# Patient Record
Sex: FEMALE | Race: WHITE | Employment: UNEMPLOYED | URBAN - METROPOLITAN AREA
[De-identification: names, ages, dates, MRNs, and addresses within clinical notes are randomized per-mention and may not be internally consistent; named-entity substitution may affect disease eponyms.]

---

## 2021-10-20 ENCOUNTER — OFFICE VISIT (OUTPATIENT)
Dept: UROLOGY | Facility: HOSPITAL | Age: 71
End: 2021-10-20
Attending: OBSTETRICS & GYNECOLOGY
Payer: MEDICARE

## 2021-10-20 VITALS — WEIGHT: 254 LBS | BODY MASS INDEX: 42.32 KG/M2 | TEMPERATURE: 97 F | HEIGHT: 65 IN

## 2021-10-20 DIAGNOSIS — R39.15 URINARY URGENCY: Primary | ICD-10-CM

## 2021-10-20 DIAGNOSIS — Z87.440 HISTORY OF RECURRENT UTIS: ICD-10-CM

## 2021-10-20 DIAGNOSIS — N81.89 PELVIC FLOOR WEAKNESS: ICD-10-CM

## 2021-10-20 DIAGNOSIS — N39.41 URGE INCONTINENCE: ICD-10-CM

## 2021-10-20 PROCEDURE — 87186 SC STD MICRODIL/AGAR DIL: CPT | Performed by: OBSTETRICS & GYNECOLOGY

## 2021-10-20 PROCEDURE — 81002 URINALYSIS NONAUTO W/O SCOPE: CPT | Performed by: OBSTETRICS & GYNECOLOGY

## 2021-10-20 PROCEDURE — 87086 URINE CULTURE/COLONY COUNT: CPT | Performed by: OBSTETRICS & GYNECOLOGY

## 2021-10-20 PROCEDURE — 99202 OFFICE O/P NEW SF 15 MIN: CPT

## 2021-10-20 PROCEDURE — 87077 CULTURE AEROBIC IDENTIFY: CPT | Performed by: OBSTETRICS & GYNECOLOGY

## 2021-10-20 PROCEDURE — 51701 INSERT BLADDER CATHETER: CPT | Performed by: OBSTETRICS & GYNECOLOGY

## 2021-10-20 RX ORDER — METOPROLOL TARTRATE 50 MG/1
50 TABLET, FILM COATED ORAL 3 TIMES DAILY
COMMUNITY
Start: 2021-04-28 | End: 2021-11-17

## 2021-10-20 RX ORDER — RIVAROXABAN 20 MG/1
20 TABLET, FILM COATED ORAL
COMMUNITY
Start: 2021-10-05

## 2021-10-20 RX ORDER — ROSUVASTATIN CALCIUM 20 MG/1
20 TABLET, COATED ORAL EVERY EVENING
COMMUNITY
Start: 2021-10-04

## 2021-10-20 RX ORDER — ESTRADIOL 0.1 MG/G
CREAM VAGINAL
COMMUNITY
Start: 2021-07-11 | End: 2021-11-10

## 2021-10-20 RX ORDER — MIRABEGRON 50 MG/1
1 TABLET, FILM COATED, EXTENDED RELEASE ORAL DAILY
COMMUNITY
Start: 2021-10-08 | End: 2021-11-17

## 2021-10-20 NOTE — PROCEDURES
Pt voided 75 ml cloudy, concentrated urine in privacy of bathroom. Cathed per Dr. Cristiane Bose for 30 ml PVR, see dipstick. Sent culture.

## 2021-10-20 NOTE — PROGRESS NOTES
ID: Patricia Chen  : 1950  Date: 10/20/2021       Patient presents with:  Urinary Urgency: self referred      HPI:  The patient is a 70year-old female,  (vaginal deliveries), who is self referred.   She presents for evaluation of urinary urg OBESITY    • MONSE (obstructive sleep apnea)    • Peripheral neuropathy    • Restless leg syndrome    • Sleep apnea       Past Surgical History:   Procedure Laterality Date   • CARDIOVERSION  2021    also 2008   • CATARACT     • COLONOSCOPY  2/05    Dr. Roberto Louis Disp: , Rfl:   rosuvastatin 20 MG Oral Tab, Take 20 mg by mouth every evening., Disp: , Rfl:   Sertraline HCl 50 MG Oral Tab, Take 50 mg by mouth daily. , Disp: , Rfl:   NEUPRO 6 MG/24HR Transdermal Patch 24 Hr, , Disp: , Rfl:   gabapentin (NEURONTIN) 600 M hemorrhoids  Rectum: deferred     PELVIS FLOOR NEUROMUSCULAR FUNCTION:  Strength:  2  Perineal Sensation:  Normal      PELVIC SUPPORT:  [place]:  0  Ant:  0  Post:  2  CST:  negative  UVJ: not hypermobile    PROLAPSE ASSESSMENT SCALE:      Aa: -3 Ba: -3 C: -7 up after testing. Patient and  agree with plan.      Markus Belcher MD, [facility]  Female Pelvic Medicine and  Reconstructive Surgery (Urogynecology)  [phone] (Pager)

## 2021-10-22 ENCOUNTER — TELEPHONE (OUTPATIENT)
Dept: UROLOGY | Facility: HOSPITAL | Age: 71
End: 2021-10-22

## 2021-10-22 RX ORDER — NITROFURANTOIN 25; 75 MG/1; MG/1
100 CAPSULE ORAL 2 TIMES DAILY
Qty: 14 CAPSULE | Refills: 0 | Status: SHIPPED | OUTPATIENT
Start: 2021-10-22 | End: 2021-10-29

## 2021-10-22 NOTE — TELEPHONE ENCOUNTER
reviewed culture results with Dr. Jo Ann Milian (covering for Dr. Moe Red), per written note from Dr. Chrissie Harris 100 mg tabs one tab po BID for 7 days, medication e-scribed, pt notified, verbalizing understanding of orders

## 2021-11-10 ENCOUNTER — OFFICE VISIT (OUTPATIENT)
Dept: UROLOGY | Facility: HOSPITAL | Age: 71
End: 2021-11-10
Attending: OBSTETRICS & GYNECOLOGY
Payer: MEDICARE

## 2021-11-10 VITALS — TEMPERATURE: 97 F | RESPIRATION RATE: 16 BRPM | BODY MASS INDEX: 42 KG/M2 | WEIGHT: 254 LBS

## 2021-11-10 DIAGNOSIS — N95.2 POSTMENOPAUSAL ATROPHIC VAGINITIS: ICD-10-CM

## 2021-11-10 DIAGNOSIS — R39.15 URINARY URGENCY: Primary | ICD-10-CM

## 2021-11-10 PROCEDURE — 51741 ELECTRO-UROFLOWMETRY FIRST: CPT

## 2021-11-10 PROCEDURE — 51729 CYSTOMETROGRAM W/VP&UP: CPT

## 2021-11-10 PROCEDURE — 87086 URINE CULTURE/COLONY COUNT: CPT

## 2021-11-10 PROCEDURE — 51797 INTRAABDOMINAL PRESSURE TEST: CPT

## 2021-11-10 PROCEDURE — 81002 URINALYSIS NONAUTO W/O SCOPE: CPT

## 2021-11-10 PROCEDURE — 51784 ANAL/URINARY MUSCLE STUDY: CPT

## 2021-11-10 RX ORDER — ESTRADIOL 0.1 MG/G
0.5 CREAM VAGINAL
Qty: 42.5 G | Refills: 2 | Status: SHIPPED | OUTPATIENT
Start: 2021-11-10

## 2021-11-10 NOTE — PROGRESS NOTES
Patient currently taking Estrace vaginal cream as documented by Dr. Sherif Mujica on visit 1/20/21 - requested for refill to be processed to pharmacy - completed   Recently completed MAcrobid for UTI - denies UTI symptoms today - cath specimen small for leukocyt

## 2021-11-10 NOTE — PROCEDURES
Patient here for urodynamic testing. Procedure explained and confirmed by patient. See evaluation form for results. Both verbal and written discharge instructions were given.   Patient tolerated procedure well and will follow up with Dr. Reyna King Mobile  [x]  Fixed    Perineal Sensation:  [x]  Normal  []  Abnormal    Additional Notes: Challenging urethral visualization due to patient weight     PROLAPSE (past introitus):  []  Yes  [x]  No  Prolapse reduced for testing?   [x]  Yes  []  No  [x]  Mardenaissatou Colon volume:      244 plus  mL  Maximum flow rate:       7 mL/sec  Pressure Detrusor (at maximum flow):   32         cm H2O  Post void residual:          35     mL  Voiding mechanism:  []  Abnormal  [x]  Normal  []  Strain to void   []  Weak detrusor  Void:   [

## 2021-11-10 NOTE — PATIENT INSTRUCTIONS
[address]  [place] [address]  [facility]: [phone]  FAX: [phone]       Urodynamic Testing Discharge Instructions: There are NO dietary or activity restrictions.   You may resume

## 2021-11-17 ENCOUNTER — OFFICE VISIT (OUTPATIENT)
Dept: UROLOGY | Facility: HOSPITAL | Age: 71
End: 2021-11-17
Attending: OBSTETRICS & GYNECOLOGY
Payer: MEDICARE

## 2021-11-17 VITALS — BODY MASS INDEX: 42.32 KG/M2 | HEIGHT: 65 IN | TEMPERATURE: 97 F | WEIGHT: 254 LBS

## 2021-11-17 DIAGNOSIS — N81.89 PELVIC FLOOR WEAKNESS: ICD-10-CM

## 2021-11-17 DIAGNOSIS — N39.41 URGE INCONTINENCE: Primary | ICD-10-CM

## 2021-11-17 DIAGNOSIS — Z87.440 HISTORY OF RECURRENT UTIS: ICD-10-CM

## 2021-11-17 PROBLEM — N32.81 DETRUSOR INSTABILITY: Status: ACTIVE | Noted: 2021-11-17

## 2021-11-17 PROCEDURE — 99212 OFFICE O/P EST SF 10 MIN: CPT

## 2021-11-17 NOTE — PROGRESS NOTES
ID: Jace Alas  : 1950  Date: 21      Patient presents with:  UDS Follow-up      HPI:  The patient is a 70year-old female,  (vaginal deliveries), who was last seen in the office on 10/20/21.  Please refer to previous office note for undergone without complication on 57/89/91. Results reviewed with the patient and . 122 ml void (12 ml PVR)  264 ml capacity  + Unstable detrusor at 254 mL, resulting in urgency and leaking.   No urodynamic ANDREW with cough or Valsalva at any volumes

## 2022-03-09 ENCOUNTER — OFFICE VISIT (OUTPATIENT)
Dept: UROLOGY | Facility: CLINIC | Age: 72
End: 2022-03-09
Attending: OBSTETRICS & GYNECOLOGY
Payer: MEDICARE

## 2022-03-09 VITALS
BODY MASS INDEX: 42 KG/M2 | DIASTOLIC BLOOD PRESSURE: 70 MMHG | TEMPERATURE: 97 F | WEIGHT: 254 LBS | RESPIRATION RATE: 16 BRPM | SYSTOLIC BLOOD PRESSURE: 120 MMHG

## 2022-03-09 DIAGNOSIS — N81.89 PELVIC FLOOR WEAKNESS: ICD-10-CM

## 2022-03-09 DIAGNOSIS — N39.41 URGE INCONTINENCE: Primary | ICD-10-CM

## 2022-03-09 PROCEDURE — 99212 OFFICE O/P EST SF 10 MIN: CPT

## 2022-03-09 RX ORDER — SOLIFENACIN SUCCINATE 5 MG/1
5 TABLET, FILM COATED ORAL DAILY
Qty: 30 TABLET | Refills: 3 | Status: SHIPPED | OUTPATIENT
Start: 2022-03-09 | End: 2022-03-24 | Stop reason: ALTCHOICE

## 2022-03-16 ENCOUNTER — TELEPHONE (OUTPATIENT)
Dept: UROLOGY | Facility: CLINIC | Age: 72
End: 2022-03-16

## 2022-03-16 NOTE — TELEPHONE ENCOUNTER
Called patient, insurance denied Dr. Eric Belcher Elvie out this week, will wait until her return to decide on an alternative, pt verbalizing understanding

## 2022-03-24 ENCOUNTER — TELEPHONE (OUTPATIENT)
Dept: UROLOGY | Facility: CLINIC | Age: 72
End: 2022-03-24

## 2022-03-24 RX ORDER — FESOTERODINE FUMARATE 4 MG/1
4 TABLET, FILM COATED, EXTENDED RELEASE ORAL DAILY
Qty: 30 TABLET | Refills: 2 | Status: SHIPPED | OUTPATIENT
Start: 2022-03-24 | End: 2022-04-23

## 2022-03-24 NOTE — TELEPHONE ENCOUNTER
Elisa denied by [facility], Dr. Radha Donohue informed, VORB Toviaz 4 mg tabs one tab po daily #30 with 2 refills, to call in 2-3 weeks with an update, patient notified verbalizing understanding, will e-scribe

## 2022-04-05 NOTE — TELEPHONE ENCOUNTER
Pt phoned after receiving a refill request for macrobid from for Dr Ez Nj. Pt sees Dr Angel Goldstein. LM for pt on VM regarding if this is an error. If pt is having UTI sx, will need a ucx. Request a callback.

## 2022-04-07 RX ORDER — NITROFURANTOIN 25; 75 MG/1; MG/1
CAPSULE ORAL
Qty: 14 CAPSULE | Refills: 0 | OUTPATIENT
Start: 2022-04-07

## 2022-04-12 ENCOUNTER — TELEPHONE (OUTPATIENT)
Dept: UROLOGY | Facility: CLINIC | Age: 72
End: 2022-04-12

## 2022-04-12 NOTE — TELEPHONE ENCOUNTER
Pt called with condition update, LM that she is happy with Juhi Vandana, stating \"its wonderful. Gara Jazmin) has stopped the urge and pressure\". Pt has scheduled follow up with Dr. Randy Romo 6-15-22.

## 2022-06-13 DIAGNOSIS — N39.41 URGE INCONTINENCE: Primary | ICD-10-CM

## 2022-06-15 ENCOUNTER — OFFICE VISIT (OUTPATIENT)
Dept: UROLOGY | Facility: CLINIC | Age: 72
End: 2022-06-15
Attending: OBSTETRICS & GYNECOLOGY
Payer: MEDICARE

## 2022-06-15 VITALS — HEIGHT: 65 IN | TEMPERATURE: 97 F | WEIGHT: 254 LBS | BODY MASS INDEX: 42.32 KG/M2

## 2022-06-15 DIAGNOSIS — N39.41 URGE INCONTINENCE: Primary | ICD-10-CM

## 2022-06-15 DIAGNOSIS — Z87.440 HISTORY OF RECURRENT UTIS: ICD-10-CM

## 2022-06-15 PROCEDURE — 99212 OFFICE O/P EST SF 10 MIN: CPT

## 2022-06-15 RX ORDER — SOLIFENACIN SUCCINATE 5 MG/1
5 TABLET, FILM COATED ORAL DAILY
COMMUNITY
End: 2022-06-15

## 2022-06-15 RX ORDER — FESOTERODINE FUMARATE 4 MG/1
TABLET, FILM COATED, EXTENDED RELEASE ORAL
Qty: 90 TABLET | Refills: 3 | Status: SHIPPED | OUTPATIENT
Start: 2022-06-15 | End: 2022-06-15

## 2022-06-15 RX ORDER — FESOTERODINE FUMARATE 4 MG/1
TABLET, FILM COATED, EXTENDED RELEASE ORAL
Qty: 90 TABLET | Refills: 3 | Status: SHIPPED | OUTPATIENT
Start: 2022-06-15

## 2022-06-15 NOTE — TELEPHONE ENCOUNTER
Patient in office today(6/15/2022) happy with medication tolerating dry month. Tania Hodge 4 mg q day may have a year prescription.

## 2022-07-01 ENCOUNTER — TELEPHONE (OUTPATIENT)
Dept: UROLOGY | Facility: CLINIC | Age: 72
End: 2022-07-01

## 2022-07-01 DIAGNOSIS — R30.0 DYSURIA: Primary | ICD-10-CM

## 2022-07-01 RX ORDER — NITROFURANTOIN 25; 75 MG/1; MG/1
100 CAPSULE ORAL 2 TIMES DAILY
Qty: 14 CAPSULE | Refills: 0 | Status: SHIPPED | OUTPATIENT
Start: 2022-07-01

## 2022-07-01 NOTE — TELEPHONE ENCOUNTER
Patient having dysuria. Lives in [place]. Plan to get urine culture at [facility] order faxed to [phone].   TORMADISYN Marie Macrobid 100 mg BID for 7 days dispense #14 tabs

## 2022-07-03 NOTE — TELEPHONE ENCOUNTER
Order faxed successfully to [facility] lab 7/1/2022. Patient left message stating [facility] did not get order. Patient went 7/2 unable to have culture done. Started macrobid, will call with an update 7/5 as it is a holiday weekend. Apologetic and grateful to have the medication.

## 2022-07-29 DIAGNOSIS — N95.2 POSTMENOPAUSAL ATROPHIC VAGINITIS: ICD-10-CM

## 2022-07-29 RX ORDER — ESTRADIOL 0.1 MG/G
1 CREAM VAGINAL
Qty: 42.5 G | Refills: 3 | Status: SHIPPED | OUTPATIENT
Start: 2022-07-29

## 2022-11-18 ENCOUNTER — TELEPHONE (OUTPATIENT)
Dept: UROLOGY | Facility: CLINIC | Age: 72
End: 2022-11-18

## 2022-11-18 NOTE — TELEPHONE ENCOUNTER
Tc from pt requesting for alternate for estrace cream as her insurance is now charging $400/ tube for refill with new medicare enrollment   Enrollment ends 12/7, pt needing to remedy cost before than.   Tc to patient to discuss good rx but both phone lines No answer, no vm ability to leave message

## 2022-12-14 ENCOUNTER — OFFICE VISIT (OUTPATIENT)
Dept: UROLOGY | Facility: CLINIC | Age: 72
End: 2022-12-14
Attending: OBSTETRICS & GYNECOLOGY
Payer: MEDICARE

## 2022-12-14 VITALS — BODY MASS INDEX: 38.32 KG/M2 | WEIGHT: 230 LBS | HEIGHT: 65 IN | TEMPERATURE: 98 F

## 2022-12-14 DIAGNOSIS — Z87.440 HISTORY OF RECURRENT UTIS: ICD-10-CM

## 2022-12-14 DIAGNOSIS — N95.2 POSTMENOPAUSAL ATROPHIC VAGINITIS: ICD-10-CM

## 2022-12-14 DIAGNOSIS — N39.41 URGE INCONTINENCE: Primary | ICD-10-CM

## 2022-12-14 DIAGNOSIS — N81.89 PELVIC FLOOR WEAKNESS: ICD-10-CM

## 2022-12-14 PROCEDURE — 99212 OFFICE O/P EST SF 10 MIN: CPT

## 2022-12-14 RX ORDER — TOPIRAMATE 25 MG/1
25 TABLET ORAL 2 TIMES DAILY
COMMUNITY
Start: 2022-10-04

## 2023-03-28 ENCOUNTER — TELEPHONE (OUTPATIENT)
Dept: UROLOGY | Facility: CLINIC | Age: 73
End: 2023-03-28

## 2023-03-28 NOTE — TELEPHONE ENCOUNTER
[facility]  [id number] Pt notified of progress, awaiting next steps via fax

## 2023-03-30 NOTE — TELEPHONE ENCOUNTER
Attempted to reach patient to inform Lily requesting pt try Gemtesa before Toviaz full fill. No VM or ability to leave message.   Will try again 3/31

## 2023-03-31 NOTE — TELEPHONE ENCOUNTER
Second attempt to call pt regarding [employer] requesting pt try René Wray for full coverage prior to full coverage of Trospium. Both home and cell phone called, no vm availability.

## 2023-04-12 ENCOUNTER — TELEPHONE (OUTPATIENT)
Dept: UROLOGY | Facility: CLINIC | Age: 73
End: 2023-04-12

## 2023-04-14 ENCOUNTER — TELEPHONE (OUTPATIENT)
Dept: UROLOGY | Facility: CLINIC | Age: 73
End: 2023-04-14

## 2023-04-14 RX ORDER — VIBEGRON 75 MG/1
1 TABLET, FILM COATED ORAL DAILY
Qty: 30 TABLET | Refills: 1 | Status: SHIPPED | OUTPATIENT
Start: 2023-04-14 | End: 2023-05-14

## 2023-04-14 NOTE — TELEPHONE ENCOUNTER
Patient called office, states that insurance will not cover her current medication of Toviaz without trying Myrbetriq and Gemtesa. Patient has already tried Myrbetriq with no improvement. Fax was received from [place] on 3/14/23. Dr. Lucille Alvarez reviewed the fax and wrote that we may order Ophelia [place] for the patient. Fax was initialed and dated by Dr. Lucille Alvarez on 3/24/23. Order entered for Ophelia [place] and sent to pharmacy on file. Attempted to call patient back on her landline at [phone] to inform her of the medication change and it being sent to her pharmacy on file. No answer, no voice message available.

## 2023-04-14 NOTE — TELEPHONE ENCOUNTER
Attempted to call patient again to inform her of Sharnatalya Isaiah being ordered at pharmacy on file. No answer and no voice mail to leave message.

## 2023-04-20 ENCOUNTER — TELEPHONE (OUTPATIENT)
Dept: UROLOGY | Facility: CLINIC | Age: 73
End: 2023-04-20

## 2023-04-20 NOTE — TELEPHONE ENCOUNTER
Patient LVM on RN line stating that she would like someone to call her regarding Toviaz Rx. Attempted to call patient back, but there was no answer and no voice mail to leave message.

## 2023-05-03 ENCOUNTER — TELEPHONE (OUTPATIENT)
Dept: UROLOGY | Facility: CLINIC | Age: 73
End: 2023-05-03

## 2023-05-03 NOTE — TELEPHONE ENCOUNTER
Renewal of a Prior Authorization done today on Cover My Meds; Ruiz [id number]. After resubmission of the PA, a message was received that \"Drug is covered by current benefit plan, No further PA activity needed. \"    Attempted to contact patient on home phone number, no answer, no voice mail. Spoke with patient on mobile number and informed her of above. Informed her to contact the pharmacy and /or insurance to verify that the medication is covered. Requested a call back if there is a problem with the RX. Patient verbalized understanding.

## 2023-06-07 ENCOUNTER — OFFICE VISIT (OUTPATIENT)
Dept: UROLOGY | Facility: CLINIC | Age: 73
End: 2023-06-07
Attending: OBSTETRICS & GYNECOLOGY
Payer: MEDICARE

## 2023-06-07 VITALS — BODY MASS INDEX: 38.32 KG/M2 | TEMPERATURE: 99 F | WEIGHT: 230 LBS | HEIGHT: 65 IN

## 2023-06-07 DIAGNOSIS — N81.89 PELVIC FLOOR WEAKNESS: ICD-10-CM

## 2023-06-07 DIAGNOSIS — N95.2 POSTMENOPAUSAL ATROPHIC VAGINITIS: ICD-10-CM

## 2023-06-07 DIAGNOSIS — Z87.440 HISTORY OF RECURRENT UTIS: ICD-10-CM

## 2023-06-07 DIAGNOSIS — N39.41 URGE INCONTINENCE: Primary | ICD-10-CM

## 2023-06-07 PROCEDURE — 99212 OFFICE O/P EST SF 10 MIN: CPT

## 2023-06-07 RX ORDER — FESOTERODINE FUMARATE 4 MG/1
TABLET, EXTENDED RELEASE ORAL
COMMUNITY
Start: 2023-03-09 | End: 2023-06-07 | Stop reason: ALTCHOICE

## 2023-06-07 RX ORDER — ESTRADIOL 0.1 MG/G
CREAM VAGINAL
Qty: 42.5 G | Refills: 3 | Status: SHIPPED | OUTPATIENT
Start: 2023-06-07

## 2023-06-07 RX ORDER — FESOTERODINE FUMARATE 4 MG/1
4 TABLET, EXTENDED RELEASE ORAL DAILY
Qty: 90 TABLET | Refills: 3 | Status: SHIPPED | OUTPATIENT
Start: 2023-06-07

## 2024-03-12 ENCOUNTER — TELEPHONE (OUTPATIENT)
Dept: UROLOGY | Facility: CLINIC | Age: 74
End: 2024-03-12

## 2024-03-12 NOTE — TELEPHONE ENCOUNTER
TC from pt saying her insurance is telling her toviaz is not covered any more. Advised pt to check which meds are on her formulary list. Pt verbalized an understanding and agrees w/ plan. All questions answered.

## 2024-03-15 RX ORDER — TROSPIUM CHLORIDE ER 60 MG/1
60 CAPSULE ORAL DAILY
Qty: 30 CAPSULE | Refills: 1 | Status: SHIPPED | OUTPATIENT
Start: 2024-03-15 | End: 2024-03-18

## 2024-03-15 NOTE — TELEPHONE ENCOUNTER
TC from pt saying myrbetriq is covered, but she feels myrbetriq makes her \"feels funny, like I'm flying\". Reviewed Dr Maldonado's notes that said pt reported not a lot of improvement w/ Myrbetriq. Will message Dr Maldonado.

## 2024-03-15 NOTE — TELEPHONE ENCOUNTER
TC back to pt after speaking to Dr Maldonado. TORB Trospium 60mg po dly. Take on empty stomach.  Pt has a month supply left of toviaz and will finish that before starting trospium.   Per Dr Maldonado, may f/u w/ PA. Appt made w/ JOSE CARLOS Buchanan in 6 wks.  Pt verbalized an understanding and agrees w/ plan. All questions answered.

## 2024-03-18 RX ORDER — TROSPIUM CHLORIDE ER 60 MG/1
60 CAPSULE ORAL DAILY
Qty: 90 CAPSULE | Refills: 0 | Status: SHIPPED | OUTPATIENT
Start: 2024-03-18

## 2024-04-22 ENCOUNTER — OFFICE VISIT (OUTPATIENT)
Dept: UROLOGY | Facility: CLINIC | Age: 74
End: 2024-04-22
Attending: PHYSICIAN ASSISTANT
Payer: MEDICARE

## 2024-04-22 VITALS — HEART RATE: 60 BPM | BODY MASS INDEX: 38 KG/M2 | RESPIRATION RATE: 18 BRPM | HEIGHT: 65 IN

## 2024-04-22 DIAGNOSIS — N81.89 PELVIC FLOOR WEAKNESS: ICD-10-CM

## 2024-04-22 DIAGNOSIS — N39.41 URGE INCONTINENCE: Primary | ICD-10-CM

## 2024-04-22 DIAGNOSIS — N95.2 POSTMENOPAUSAL ATROPHIC VAGINITIS: ICD-10-CM

## 2024-04-22 PROCEDURE — 99212 OFFICE O/P EST SF 10 MIN: CPT

## 2024-04-22 RX ORDER — TROSPIUM CHLORIDE ER 60 MG/1
60 CAPSULE ORAL DAILY
Qty: 90 CAPSULE | Refills: 3 | Status: SHIPPED | OUTPATIENT
Start: 2024-04-22

## 2024-04-22 NOTE — PROGRESS NOTES
Patient presents to follow up UUI    Currently taking trospium ER 60 mg daily  Denies SEs  Reports +improvement but having insurance issues    Using vag estrogen 2x weekly  Bowels sometimes constipated    Previously tried:  Myrbetriq 50 mg  Gemtesa  Toviaz 4 mg  PFPT    Urogynecology Summary:  Urogynecology Summary  Prolapse: No  ANDREW: No  Urge Incontinence: Yes  Nocturia Frequency: 1 (0-1 times)  Frequency: Greater than 3 hours (every 3-4 hours)  Incomplete emptying: No  Constipation: Yes  Activities are limited by UI/POP?: No  Currently Sexually Active: No    Vitals:  Pulse 60   Resp 18   Ht 65\"   BMI 38.27 kg/m²     GENERAL EXAM:  GENERAL: alert & oriented, NAD  HEENT: NC/AT, sclera without injection  SKIN: no rashes  LUNGS:  without increased respiratory effort  ABDOMEN: soft, non-tender, non-distended, no masses appreciated  EXT: no edema    PELVIC EXAM:  External Genitalia: Normal appearance for age. + atrophy, no lesions  Urethra: + atrophy, non tender  Bladder: no fullness, non tender  Vagina: + atrophy, no lesions   Cervix and uterus: surgically absent  Adnexa: no masses, non tender  Perineum: non tender  Anus: no hemorrhoids  Rectum: deferred      PELVIS FLOOR NEUROMUSCULAR FUNCTION:  Strength:  2  Perineal Sensation:  Normal        PELVIC SUPPORT:  [place]:  0  Ant:  0  Post:  2  CST:  negative  UVJ: not hypermobile    Impression/Plan:    ICD-10-CM    1. Urge incontinence  N39.41       2. Postmenopausal atrophic vaginitis  N95.2       3. Pelvic floor weakness  N81.89           Discussion Items:   Discussed mgmt of vulvovaginal atrophy with vaginal estrogen cream. Reviewed associated benefits, risks, alternatives, and goals. Recommend low dose 2-3x/week treatment.    Discussed dietary and behavioral modifications for mgmt of urinary symptoms.  Discussed weight management and benefits of weight loss on urinary symptoms.  Reviewed AUA/SUFU guidelines on mgmt of non-neurogenic OAB.  Discussed pharmacologic and  nonpharmacologic mgmt options of urinary symptoms - reviewed risks, benefits, alternatives, and goals of treatment.  Discussed specific risks related to OAB meds including, but not limited to dry mouth, constipation, blurry vision, cognitive changes, and BP elevation.    Bowel management reviewed. Discussed using fiber daily w/ addition of miralax as needed.    Treatment Plan:  Continue trospium ER 60 mg (will use [facility])  Continue vag estrogen cream as prescribed  Bowel regimen  Bladder diet/drill  Pelvic floor exercises  Call with s/sx of UTI    All questions answered  She understands and agrees to plan    Return in about 6 months (around 10/22/2024) for UUI (phone).    Annetta Machado PA-C

## 2024-05-07 ENCOUNTER — TELEPHONE (OUTPATIENT)
Dept: UROLOGY | Facility: CLINIC | Age: 74
End: 2024-05-07

## 2024-05-07 RX ORDER — TROSPIUM CHLORIDE ER 60 MG/1
60 CAPSULE ORAL DAILY
Qty: 90 CAPSULE | Refills: 3 | Status: SHIPPED | OUTPATIENT
Start: 2024-05-07

## 2024-05-07 NOTE — TELEPHONE ENCOUNTER
TC from  pt saying her  doesn't want her to go through [facility]. He wants to pay into their deductible. Pt gets drug coverage through [employer].   Script for Trospium ER was printed so pt could use good rx. Will send to pt [facility]. May need to do PA.

## 2024-05-22 ENCOUNTER — TELEPHONE (OUTPATIENT)
Dept: UROLOGY | Facility: CLINIC | Age: 74
End: 2024-05-22

## 2024-07-08 RX ORDER — ESTRADIOL 0.1 MG/G
CREAM VAGINAL
Qty: 42.5 G | Refills: 3 | Status: SHIPPED | OUTPATIENT
Start: 2024-07-08

## 2024-10-21 ENCOUNTER — VIRTUAL PHONE E/M (OUTPATIENT)
Dept: UROLOGY | Facility: CLINIC | Age: 74
End: 2024-10-21
Attending: OBSTETRICS & GYNECOLOGY
Payer: MEDICARE

## 2024-10-21 DIAGNOSIS — N39.41 URGE INCONTINENCE: Primary | ICD-10-CM

## 2024-10-21 DIAGNOSIS — N95.2 POSTMENOPAUSAL ATROPHIC VAGINITIS: ICD-10-CM

## 2024-10-21 DIAGNOSIS — N81.89 PELVIC FLOOR WEAKNESS: ICD-10-CM

## 2024-10-21 NOTE — PROGRESS NOTES
This visit is being conducted as a televisit with patient's consent.  Patient is in a safe, private environment for televisit, provider located in the office setting.    Visit for f/u of UUI    Currently taking trospium 20 mg BID  Reports dry mouth, tolerable  Reports +improvement, happy     Using vag estrogen 2x weekly  Bowels sometimes constipated, using fiber daily, miralax prn    Denies current UTI s/sx     Previously tried:  Myrbetriq 50 mg  Gemtesa  Toviaz 4 mg  PFPT      Impression/Plan:    ICD-10-CM    1. Urge incontinence  N39.41       2. Postmenopausal atrophic vaginitis  N95.2       3. Pelvic floor weakness  N81.89           Discussion Items:   Bowel management reviewed. Discussed using fiber daily w/ addition of miralax as needed.    Discussed mgmt of vulvovaginal atrophy with vaginal estrogen cream. Reviewed associated benefits, risks, alternatives, and goals. Recommend low dose 2x/week treatment.    Treatment Plan:  Continue trospium 20 mg BID  Continue vag estrogen as prescribed  Bowel regimen  Bladder diet/drill  Pelvic floor exercises  Call with s/sx of UTI    All questions answered  She understands and agrees to plan    Return in about 6 months (around 4/21/2025) for UUI, yearly exam.    Annetta Machado PA-C    Note to patient: The 21st Century Cures Act makes medical notes like these available to patients in the interest of transparency.  However, be advised this is a medical document.  It is intended as peer to peer communication.  It is written in medical language and may contain abbreviations or verbiage that are unfamiliar.  It may appear blunt or direct.  Medical documents are intended to carry relevant information, facts as evident, and the clinical opinion of the practitioner.

## 2024-10-22 ENCOUNTER — TELEPHONE (OUTPATIENT)
Dept: UROLOGY | Facility: CLINIC | Age: 74
End: 2024-10-22

## 2025-03-24 RX ORDER — TROSPIUM CHLORIDE 20 MG/1
20 TABLET, FILM COATED ORAL 2 TIMES DAILY
Qty: 180 TABLET | Refills: 0 | Status: SHIPPED | OUTPATIENT
Start: 2025-03-24

## 2025-03-24 NOTE — TELEPHONE ENCOUNTER
LOV 4/22/24, 90 day refill sent. Pt needs f/u appt for additional refill. No current appt scheduled.

## 2025-04-28 ENCOUNTER — OFFICE VISIT (OUTPATIENT)
Dept: UROLOGY | Facility: CLINIC | Age: 75
End: 2025-04-28
Attending: OBSTETRICS & GYNECOLOGY
Payer: MEDICARE

## 2025-04-28 VITALS — BODY MASS INDEX: 38 KG/M2 | HEIGHT: 65 IN | RESPIRATION RATE: 16 BRPM | TEMPERATURE: 98 F

## 2025-04-28 DIAGNOSIS — N39.41 URGE INCONTINENCE: Primary | ICD-10-CM

## 2025-04-28 DIAGNOSIS — R39.15 URINARY URGENCY: ICD-10-CM

## 2025-04-28 DIAGNOSIS — R35.1 NOCTURIA: ICD-10-CM

## 2025-04-28 DIAGNOSIS — N81.89 PELVIC FLOOR WEAKNESS: ICD-10-CM

## 2025-04-28 DIAGNOSIS — N95.2 POSTMENOPAUSAL ATROPHIC VAGINITIS: ICD-10-CM

## 2025-04-28 LAB
CONTROL RUN WITHIN 24 HOURS?: YES
NITRITE URINE: POSITIVE

## 2025-04-28 PROCEDURE — 99212 OFFICE O/P EST SF 10 MIN: CPT

## 2025-04-28 PROCEDURE — 87086 URINE CULTURE/COLONY COUNT: CPT | Performed by: PHYSICIAN ASSISTANT

## 2025-04-28 PROCEDURE — 51701 INSERT BLADDER CATHETER: CPT | Performed by: PHYSICIAN ASSISTANT

## 2025-04-28 PROCEDURE — 81002 URINALYSIS NONAUTO W/O SCOPE: CPT | Performed by: PHYSICIAN ASSISTANT

## 2025-04-28 RX ORDER — NITROFURANTOIN 25; 75 MG/1; MG/1
100 CAPSULE ORAL 2 TIMES DAILY
Qty: 14 CAPSULE | Refills: 0 | Status: SHIPPED | OUTPATIENT
Start: 2025-04-28 | End: 2025-05-05

## 2025-04-28 RX ORDER — ESTRADIOL 0.1 MG/G
CREAM VAGINAL
Qty: 42.5 G | Refills: 3 | Status: SHIPPED | OUTPATIENT
Start: 2025-04-28

## 2025-04-28 RX ORDER — TROSPIUM CHLORIDE 20 MG/1
20 TABLET, FILM COATED ORAL 2 TIMES DAILY
Qty: 180 TABLET | Refills: 3 | Status: SHIPPED | OUTPATIENT
Start: 2025-04-28

## 2025-04-28 NOTE — PROGRESS NOTES
Patient presents to follow up UUI    Currently taking trospium 20 mg BID  Reports some dry mouth, tolerable  Reports 80% improvement overall but worsening nocturia and leaking in past few months    Using vag estrogen 2x weekly  Bowels constipated    Previously tried:  Myrbetriq 50 mg  Gemtesa  Toviaz 4 mg  PFPT    Urogynecology Summary:  Urogynecology Summary  Prolapse: No  ANDREW: No  Urge Incontinence: Yes  Nocturia Frequency: 2  Frequency: Greater than 3 hours (every 3-4 hours)  Incomplete emptying: No  Constipation: Yes  Wears pad day?: 0  Wears Pad Night?: 1 (diaper)  Activities are limited by UI/POP?: No  Currently Sexually Active: No    Vitals:  Temp 97.9 °F (36.6 °C)   Resp 16   Ht 65\"   BMI 38.27 kg/m²     GENERAL EXAM:  GENERAL: alert & oriented, NAD  HEENT: NC/AT, sclera without injection  SKIN: no rashes  LUNGS:  without increased respiratory effort  ABDOMEN: soft, non-tender, non-distended, no masses appreciated  EXT: no edema    PELVIC EXAM: GRZEGORZ Castillo, present for exam as a chaperone  External Genitalia: Normal appearance for age. + atrophy, no lesions  Urethra: + atrophy, non tender  Bladder: no fullness, non tender  Vagina: + atrophy, no lesions   Cervix and uterus: surgically absent  Adnexa: no masses, non tender  Perineum: non tender  Anus: no hemorrhoids  Rectum: deferred      PELVIS FLOOR NEUROMUSCULAR FUNCTION:  Strength:  2  Perineal Sensation:  Normal        PELVIC SUPPORT:  [place]:  0  Ant:  0  Post:  2  CST:  negative  UVJ: not hypermobile    After obtaining patient's verbal consent, sterile technique used to prep urethra and collect urine.  Sent for culture.    Impression/Plan:    ICD-10-CM    1. Urge incontinence  N39.41 Trospium Chloride 20 MG Oral Tab      2. Nocturia  R35.1 Straight Cath PVR     Trospium Chloride 20 MG Oral Tab      3. Postmenopausal atrophic vaginitis  N95.2 estradiol 0.1 MG/GM Vaginal Cream      4. Pelvic floor weakness  N81.89       5. Urinary urgency  R39.15 Urine  Culture, Routine     POCT urinalysis dipstick[74230]     nitrofurantoin monohydrate macro 100 MG Oral Cap          Discussion Items:   Bowel management reviewed. Discussed using fiber daily w/ addition of miralax as needed.    Discussed mgmt of vulvovaginal atrophy with vaginal estrogen cream. Reviewed associated benefits, risks, alternatives, and goals. Recommend low dose 2-3x/week treatment.    Discussed dietary and behavioral modifications for mgmt of urinary symptoms.  Discussed weight management and benefits of weight loss on urinary symptoms.  Reviewed AUA/SUFU guidelines on mgmt of non-neurogenic OAB.  Discussed pharmacologic and nonpharmacologic mgmt options of urinary symptoms - reviewed risks, benefits, alternatives, and goals of treatment.  Discussed specific risks related to OAB meds including, but not limited to dry mouth, constipation, blurry vision, cognitive changes, and BP elevation.    Discussed dietary and behavioral modifications for mgmt of urinary symptoms.  Discussed weight management and benefits of weight loss on urinary symptoms.  Reviewed AUA/SUFU guidelines on mgmt of non-neurogenic OAB.  Discussed pharmacologic and nonpharmacologic mgmt options of urinary symptoms - reviewed risks, benefits, alternatives, and goals of treatment.  Discussed specific risks related to OAB meds including, but not limited to dry mouth, constipation, blurry vision, cognitive changes, and BP elevation.      Treatment Plan:  Urine collected via straight cath & sent for Cx, follow result.  Start empiric Macrobid x 7d.  Continue vag estrogen cream as prescribed  Continue trospium 20 mg BID on empty stomach  Bowel regimen  Bladder diet/drill  Pelvic floor exercises  Call with s/sx of UTI    All questions answered  She understands and agrees to plan    Return in about 6 weeks (around 6/9/2025) for UUI (phone).    Annetta Machado PA-C    Note to patient: The 21st Century Cures Act makes medical notes like these  available to patients in the interest of transparency.  However, be advised this is a medical document.  It is intended as peer to peer communication.  It is written in medical language and may contain abbreviations or verbiage that are unfamiliar.  It may appear blunt or direct.  Medical documents are intended to carry relevant information, facts as evident, and the clinical opinion of the practitioner.

## 2025-06-09 ENCOUNTER — VIRTUAL PHONE E/M (OUTPATIENT)
Dept: UROLOGY | Facility: CLINIC | Age: 75
End: 2025-06-09
Attending: OBSTETRICS & GYNECOLOGY
Payer: MEDICARE

## 2025-06-09 ENCOUNTER — TELEPHONE (OUTPATIENT)
Dept: UROLOGY | Facility: CLINIC | Age: 75
End: 2025-06-09

## 2025-06-09 DIAGNOSIS — N95.2 POSTMENOPAUSAL ATROPHIC VAGINITIS: ICD-10-CM

## 2025-06-09 DIAGNOSIS — N81.89 PELVIC FLOOR WEAKNESS: ICD-10-CM

## 2025-06-09 DIAGNOSIS — R35.1 NOCTURIA: ICD-10-CM

## 2025-06-09 DIAGNOSIS — N39.41 URGE INCONTINENCE: Primary | ICD-10-CM

## 2025-06-09 RX ORDER — PROPRANOLOL HYDROCHLORIDE 10 MG/1
10 TABLET ORAL 2 TIMES DAILY
COMMUNITY

## 2025-06-09 NOTE — PROGRESS NOTES
This visit is being conducted as a phone (audio only) visit with patient's consent.  Patient declined video visit due to technical capacity.  Patient is in a safe, private environment for televisit, provider located in the office setting.    Visit for f/u of UUI    She is currently taking trospium 20 mg BID  Reports dry mouth, tolerable  Reports +improvement, happy -- sx much better since treating last UTI  Using vag estrogen 3x weekly  Doing daily home PF exercises    Bowels reg     Denies any current signs or symptoms of UTI    Previously tried:  Myrbetriq 50 mg  Gemtesa  Toviaz 4 mg  PFPT    Impression/Plan:    ICD-10-CM    1. Urge incontinence  N39.41       2. Nocturia  R35.1       3. Postmenopausal atrophic vaginitis  N95.2       4. Pelvic floor weakness  N81.89           Discussion Items:   Discussed mgmt of vulvovaginal atrophy with vaginal estrogen cream. Reviewed associated benefits, risks, alternatives, and goals. Recommend low dose 3x/week treatment.    Discussed dietary and behavioral modifications for mgmt of urinary symptoms.  Discussed weight management and benefits of weight loss on urinary symptoms.  Reviewed AUA/SUFU guidelines on mgmt of non-neurogenic OAB.  Discussed pharmacologic and nonpharmacologic mgmt options of urinary symptoms - reviewed risks, benefits, alternatives, and goals of treatment.  Discussed specific risks related to OAB meds including, but not limited to dry mouth, constipation, blurry vision, cognitive changes, and BP elevation.    Treatment Plan:  Continue trospium 20 mg BID on empty stomach  Continue vag estrogen as prescribed  Bowel regimen  Bladder diet/drill  Pelvic floor exercises  Call with s/sx of UTI    All questions answered  She understands and agrees to plan    Return in about 1 year (around 6/9/2026) for UUI, yearly exam.    Annetta Machado PA-C    A total of 10 minutes were spent in discussion with the patient and/or family, chart review and documentation of this  encounter.    Note to patient: The 21st Century Cures Act makes medical notes like these available to patients in the interest of transparency.  However, be advised this is a medical document.  It is intended as peer to peer communication.  It is written in medical language and may contain abbreviations or verbiage that are unfamiliar.  It may appear blunt or direct.  Medical documents are intended to carry relevant information, facts as evident, and the clinical opinion of the practitioner.

## 2025-08-28 ENCOUNTER — TELEPHONE (OUTPATIENT)
Dept: UROLOGY | Facility: CLINIC | Age: 75
End: 2025-08-28

## 2025-08-28 DIAGNOSIS — R30.0 DYSURIA: Primary | ICD-10-CM
